# Patient Record
Sex: MALE | Race: WHITE | URBAN - METROPOLITAN AREA
[De-identification: names, ages, dates, MRNs, and addresses within clinical notes are randomized per-mention and may not be internally consistent; named-entity substitution may affect disease eponyms.]

---

## 2019-04-03 ENCOUNTER — HOSPITAL ENCOUNTER (OUTPATIENT)
Dept: PHYSICAL THERAPY | Age: 64
Discharge: HOME | End: 2019-04-03
Payer: COMMERCIAL

## 2019-04-03 PROCEDURE — 97110 THERAPEUTIC EXERCISES: CPT

## 2019-04-03 PROCEDURE — 97140 MANUAL THERAPY 1/> REGIONS: CPT

## 2019-04-03 PROCEDURE — 97161 PT EVAL LOW COMPLEX 20 MIN: CPT

## 2019-04-03 NOTE — THERAPY EVALUATION
Pawan Aguilera  : 1955  Primary: Marce Lauren  Secondary:  [facility] at [address]  [phone]   [phone]        OUTPATIENT PHYSICAL THERAPY:Initial Assessment 4/3/2019   ICD-10: Treatment Diagnosis: Pain in Joint, L knee [M25.562]; Strain of Quadriceps Muscle, Fascia and Tendon, Sequela Left [L73.013K]  Precautions/Allergies:   Patient has no allergy information on record. MD Orders: Evaluate and Treat MEDICAL/REFERRING DIAGNOSIS:  Left knee pain [M25.562]   DATE OF ONSET: 2019  REFERRING PHYSICIAN: Sharon Onofre MD  RETURN PHYSICIAN APPOINTMENT: TBD     INITIAL ASSESSMENT:  Mr. Rebecca Robertson presents with a L quadriceps muscle strain and decreased neuromuscular control of the L quadriceps muscle following hyperextension knee injury 6 weeks ago. Pain in the L knee is reproduced with resisted hip flexion and knee extension. Pain is located along the distal quadriceps muscle into the patellar tendon. Ligamentous testing is negative in the L knee and MRI rules out intraarticular damage (per patient subjective) besides tearing in meniscus. Pt. Additionally presents with flexibility limitations in the triceps surae and quadriceps. Pt. Will benefit from a strengthening and flexibility program to improve work activities and return to previous level of function. PROBLEM LIST (Impacting functional limitations):  1. Decreased Strength  2. Decreased ADL/Functional Activities  3. Increased Pain  4. Decreased Activity Tolerance  5. Decreased Flexibility/Joint Mobility  6. Decreased [place] with Home Exercise Program INTERVENTIONS PLANNED: (Treatment may consist of any combination of the following)  1. Balance Exercise  2. Home Exercise Program (HEP)  3. Manual Therapy  4. Neuromuscular Re-education/Strengthening  5. Range of Motion (ROM)  6. Therapeutic Activites  7.  Therapeutic Exercise/Strengthening TREATMENT PLAN:  Effective Dates: 4/3/2019 TO 6/2/2019 (60 days). Frequency/Duration: 2 times a week for 60 Day(s)  GOALS: (Goals have been discussed and agreed upon with patient.)  Discharge Goals: Time Frame: 6 weeks  1. Pt. Will complete 2 flights of stairs with 0/10 L knee pain to improve pain complaints with stairs at work. 2. Pt. Will demonstrate 5/5 MMT in L knee extension to demonstrate improved strength. 3. Pt. Will score >60 on the LEFS to demonstrate improved pain and function. OUTCOME MEASURE:   Tool Used: Lower Extremity Functional Scale (LEFS)  Score:  Initial: 48/80 Most Recent: X/80 (Date: -- )   Interpretation of Score: 20 questions each scored on a 5 point scale with 0 representing \"extreme difficulty or unable to perform\" and 4 representing \"no difficulty\". The lower the score, the greater the functional disability. 80/80 represents no disability. Minimal detectable change is 9 points. MEDICAL NECESSITY:   · Patient is expected to demonstrate progress in strength and range of motion to increase independence with work activies and ADL's.  REASON FOR SERVICES/OTHER COMMENTS:  · Patient will benefit from skilled PT to address impairments identified through evaluation and return to previous ADL and recreational capacity. Total Duration:  PT Patient Time In/Time Out  Time In: 1030  Time Out: 1130    Rehabilitation Potential For Stated Goals: Good  Regarding Alexis Aguilera's therapy, I certify that the treatment plan above will be carried out by a therapist or under their direction. Thank you for this referral,  Karen James, PT     Referring Physician Signature: Bobby Brown MD _______________________________ Date _____________     PAIN/SUBJECTIVE:   Initial: Pain Intensity 1: 7 /10 Post Session:  6/10   HISTORY:   History of Injury/Illness (Reason for Referral):  Pt.  Attends PT c/o L knee pain after slipping down 3 stairs and landing on the L foot with the knee going into hyperextension in mid February. Pt. Notes initially he experienced moderate knee pain and swelling. The swelling improved but he is still having pain and weakness with stair negotiation and with prolonged sitting. The pain is located just above the patella. Pt. Notes the pain feels deep. MRI revealed mild meniscus tearing and a sprain to the quadriceps. Pt. Would like to return to full work duties without knee pain. Past Medical History/Comorbidities:   Mr. Shania Salvador  has no past medical history on file. Mr. Shania Salvador  has no past surgical history on file. Social History/Living Environment:     Lives with wife in two story home  Prior Level of Function/Work/Activity:  Functioned independently without limitations. Ambulatory/Rehab Services H2 Model Falls Risk Assessment   Risk Factors:       (1)  Gender [Male]       (5)  History of Recent Falls [w/in 3 months] Ability to Rise from Chair:       (0)  Ability to rise in a single movement   Falls Prevention Plan:       No modifications necessary   Total: (5 or greater = High Risk): 6   ©2010 [facility] [employer]. All Rights Reserved. [facility] Patent #[id number]. Federal Law prohibits the replication, distribution or use without written permission from [facility] [employer]   Current Medications:     No current outpatient medications on file.    Date Last Reviewed:  4/3/2019   Number of Personal Factors/Comorbidities that affect the Plan of Care: 0: LOW COMPLEXITY   EXAMINATION:    Strength:       RIGHT LEFT    Knee extension  5/5 4/5 (difficulty with quadriceps setting compared to R)     Knee flexion 5/5 5/5     Hip extension  5/5 5/5    Hip abduction  5/5 5/5     Hip flexion  5/5  4+/5 (pain in L knee)          ROM:       RIGHT LEFT    Knee extension  (-2)  (-3)    Knee flexion  125  125          Flexibility:       RIGHT LEFT    Rectus femoris  limited limited     Psoas  limited  limited    Adductors        Hamstrings  limited limited Gastrocnemius   limited  limited          Joint Mobility:        RIGHT LEFT       --  --          Special Testing            Varus/Valgus testing (-)     Lachmans (-)     PCL testing (-)     Thessaly (-)     Joint line tenderness (-)                 Swelling/Edema:       RIGHT LEFT    Joint line 43 CM 43 CM               Body Structures Involved:  1. Joints  2. Muscles Body Functions Affected:  1. Neuromusculoskeletal  2. Movement Related Activities and Participation Affected:  1. Mobility  2.  Self Care   Number of elements (examined above) that affect the Plan of Care: 1-2: LOW COMPLEXITY   CLINICAL PRESENTATION:   Presentation: Stable and uncomplicated: LOW COMPLEXITY   CLINICAL DECISION MAKING:   Use of outcome tool(s) and clinical judgement create a POC that gives a: Clear prediction of patient's progress: LOW COMPLEXITY

## 2019-04-03 NOTE — PROGRESS NOTES
Eva Aguilera  : 1955  Primary: Regulo Lauren  Secondary:  [facility] at [facility], [address]  [phone]   [phone]      OUTPATIENT PHYSICAL THERAPY: Daily Treatment Note 4/3/2019  Pre-treatment Symptoms/Complaints:  L knee pain  Pain: Initial: Pain Intensity 1: 7 /10 Post Session:  6/10   Medications Last Reviewed:  4/3/2019  Updated Objective Findings:  See evaluation note from today   TREATMENT:     Therapeutic Exercise: (15 Minutes):  Exercises per grid below to improve mobility and strength. Required minimal verbal and manual cues to promote proper body alignment and promote proper body mechanics. Progressed repetitions as indicated. Date:  4/3/2019   Activity/Exercise Parameters   Quadriceps setting 5 minutes   SLR with quad set 3 x 5   Calf stretch 4 minutes   Quadriceps stretch 4 minutes               Manual Therapy (    Soft Tissue Mobilization Duration  Duration: 10 Minutes): Manual techniques to facilitate improved motion and decreased pain. (Used abbreviations: MET - muscle energy technique; PNF - proprioceptive neuromuscular facilitation; NMR - neuromuscular re-education; a/p - anterior to posterior; p/a - posterior to anterior)   · Soft tissue mobilization: L quadriceps  · Joint mobilization: L patellar mobilization: grade III all directions. MedBridge Portal  Treatment/Session Summary:    · Response to Treatment:  Pt. tolerates todays treatment without complaints. · Communication/Consultation:  None today  · Equipment provided today:  None today  · Recommendations/Intent for next treatment session: Next visit will focus on Quadriceps motor control and strengthening.  .  Treatment Plan of Care Effective Dates:  4/3/19 to 19  Total Treatment Billable Duration:  Evaluation 35 minutes, 15 minutes therapeutic exercise, 10 minutes manual   PT Patient Time In/Time Out  Time In: 1030  Time Out: [id number] [facility] PT    Future Appointments   Date Time Provider James Medellin   4/9/2019  9:30 AM Payal Zhu, PT [facility]   4/10/2019  9:30 AM Payal Zhu, PT [facility]   4/17/2019  8:30 AM Payal Zhu, PT [facility]   4/18/2019  8:30 AM Payal Zhu, PT [facility]   4/22/2019  1:30 PM Payal Zhu, PT [facility]

## 2019-04-09 ENCOUNTER — HOSPITAL ENCOUNTER (OUTPATIENT)
Dept: PHYSICAL THERAPY | Age: 64
Discharge: HOME | End: 2019-04-09
Payer: COMMERCIAL

## 2019-04-09 PROCEDURE — 97110 THERAPEUTIC EXERCISES: CPT

## 2019-04-09 PROCEDURE — 97140 MANUAL THERAPY 1/> REGIONS: CPT

## 2019-04-09 NOTE — PROGRESS NOTES
Cameron Aguilera  : 1955  Primary: Devan P[facility]  Secondary:  [address] at [address]  [phone]   [phone]      OUTPATIENT PHYSICAL THERAPY: Daily Treatment Note 2019  Pre-treatment Symptoms/Complaints:  Pt. Notes improved L knee pain following initial evaluation. Pain: Initial: Pain Intensity 1: 10 Post Session:  5/10   Medications Last Reviewed:  2019  Updated Objective Findings:  Improved quadriceps setting today. TREATMENT:     Therapeutic Exercise: (30 Minutes):  Exercises per grid below to improve mobility and strength. Required minimal verbal and manual cues to promote proper body alignment and promote proper body mechanics. Progressed repetitions as indicated. Date:  2019   Activity/Exercise Parameters   Quadriceps setting 3 minutes   SLR with quad set 3 x 10   Calf stretch 4 minutes   Quadriceps stretch 5 minutes   Single leg balance 2 minutes   Sit to stand (elevated) 3 x 10   Sidelying hip abduction 3 x 10   Lateral walking 4 minutes   Manual Therapy (    Soft Tissue Mobilization Duration  Duration: 25 Minutes): Manual techniques to facilitate improved motion and decreased pain. (Used abbreviations: MET - muscle energy technique; PNF - proprioceptive neuromuscular facilitation; NMR - neuromuscular re-education; a/p - anterior to posterior; p/a - posterior to anterior)   · Soft tissue mobilization: B quadriceps, triceps surae  · Joint mobilization: L patellar mobilization: grade III all directions. · Joint mobilization: long axis traction grade III distraction      MedBridge Portal  Treatment/Session Summary:    · Response to Treatment:  Pt. fatigues easily with initiation of lower quarter strengthening exercises. Myofascial trigger points in the L quadriceps improve following manual therapy todya.  .  · Communication/Consultation:  None today  · Equipment provided today:  None today  · Recommendations/Intent for next treatment session: Next visit will focus on Quadriceps motor control and strengthening.  .  Treatment Plan of Care Effective Dates:  4/3/19 to 6/2/19  Total Treatment Billable Duration:  55 minutes   PT Patient Time In/Time Out  Time In: 0900  Time Out: 71 Effie Chance PT    Future Appointments   Date Time Provider James Medellin   4/17/2019  8:30 AM LEEANNA Rubio   4/18/2019  8:30 AM LEEANNA Rubio [facility]   4/22/2019  1:30 PM Rachel Laboy PT [facility]   4/23/2019 10:30 AM Rachel Laboy PT [facility]

## 2019-04-10 ENCOUNTER — APPOINTMENT (OUTPATIENT)
Dept: PHYSICAL THERAPY | Age: 64
End: 2019-04-10
Payer: COMMERCIAL

## 2019-04-17 ENCOUNTER — HOSPITAL ENCOUNTER (OUTPATIENT)
Dept: PHYSICAL THERAPY | Age: 64
Discharge: HOME | End: 2019-04-17
Payer: COMMERCIAL

## 2019-04-17 PROCEDURE — 97140 MANUAL THERAPY 1/> REGIONS: CPT

## 2019-04-17 PROCEDURE — 97110 THERAPEUTIC EXERCISES: CPT

## 2019-04-17 NOTE — PROGRESS NOTES
Dave Aguilera  : 1955  Primary: Leighton Lauren  Secondary:  [address]  at [facility], [address] [facility]  [phone]   [phone]      OUTPATIENT PHYSICAL THERAPY: Daily Treatment Note 2019  Pre-treatment Symptoms/Complaints:  Pt. Reports improved L knee pain with stairs. Pain: Initial: Pain Intensity 1: 10 Post Session:  4/10   Medications Last Reviewed:  2019  Updated Objective Findings:  Minimal myofascial trigger points in the L quadriceps today. TREATMENT:     Therapeutic Exercise: (40 Minutes):  Exercises per grid below to improve mobility and strength. Required minimal verbal and manual cues to promote proper body alignment and promote proper body mechanics. Progressed repetitions as indicated. Date:  2019   Activity/Exercise Parameters   Quadriceps setting 1 minute   SLR with quad set 3 x 10   Calf stretch 4 minutes   Quadriceps stretch 5 minutes   Stars 5 minutes   Sit to stand (elevated) 3 x 10   Sidelying hip abduction 3 x 10   Lateral walking 4 minutes   Stair Negotiation 5 minutes   Manual Therapy (    Soft Tissue Mobilization Duration  Duration: 15 Minutes): Manual techniques to facilitate improved motion and decreased pain. (Used abbreviations: MET - muscle energy technique; PNF - proprioceptive neuromuscular facilitation; NMR - neuromuscular re-education; a/p - anterior to posterior; p/a - posterior to anterior)   · Soft tissue mobilization: B quadriceps, triceps surae  · Joint mobilization: L patellar mobilization: grade III all directions. · Joint mobilization: long axis traction grade III distraction      MedBridge Portal  Treatment/Session Summary:    · Response to Treatment:  Pt. demonstrates mild L distal quadriceps pain with stair descent today using one hand railing. Pt. will benefit from continued lower quarter strengthening.  .  · Communication/Consultation:  None today  · Equipment provided today:  None today  · Recommendations/Intent for next treatment session: Next visit will focus on Quadriceps motor control and strengthening.  .  Treatment Plan of Care Effective Dates:  4/3/19 to 6/2/19  Total Treatment Billable Duration:  55 minutes   PT Patient Time In/Time Out  Time In: 0830  Time Out: 0930  Georgiana Haque PT    Future Appointments   Date Time Provider James Medellin   4/18/2019  8:30 AM LEEANNA Cameron   4/22/2019  1:30 PM LEEANNA Cameron   4/23/2019 10:30 AM Nancy Prater PT [facility]

## 2019-04-18 ENCOUNTER — HOSPITAL ENCOUNTER (OUTPATIENT)
Dept: PHYSICAL THERAPY | Age: 64
Discharge: HOME | End: 2019-04-18
Payer: COMMERCIAL

## 2019-04-18 PROCEDURE — 97110 THERAPEUTIC EXERCISES: CPT

## 2019-04-18 PROCEDURE — 97140 MANUAL THERAPY 1/> REGIONS: CPT

## 2019-04-18 NOTE — PROGRESS NOTES
Olaf Aguilera  : 1955  Primary: Js Steen Xin  Secondary:  [address] at [address]  [phone]   [phone]      OUTPATIENT PHYSICAL THERAPY: Daily Treatment Note 2019  Pre-treatment Symptoms/Complaints:  Pt. Denies knee pain this week, just muscle soreness from previous PT session. Pain: Initial: Pain Intensity 1: 1 /10 Post Session:  1/10   Medications Last Reviewed:  2019  Updated Objective Findings:  None Today   TREATMENT:     Therapeutic Exercise: (45 Minutes):  Exercises per grid below to improve mobility and strength. Required minimal verbal and manual cues to promote proper body alignment and promote proper body mechanics. Progressed repetitions as indicated. Date:  2019   Activity/Exercise Parameters   Quadriceps setting 1 minute   SLR with quad set 3 x 15   Calf stretch 4 minutes   Quadriceps stretch 5 minutes   Stars 5 minutes   Sit to stand (elevated) 3 x 10   Sidelying hip abduction --   Lateral walking 4 minutes   Stair Negotiation --   clams 3 x 15   walking 5 minutes   bridges 3 x 10   Single leg shuttle 3 x 10   Manual Therapy (    Soft Tissue Mobilization Duration  Duration: 10 Minutes): Manual techniques to facilitate improved motion and decreased pain. (Used abbreviations: MET - muscle energy technique; PNF - proprioceptive neuromuscular facilitation; NMR - neuromuscular re-education; a/p - anterior to posterior; p/a - posterior to anterior)   · Soft tissue mobilization: B quadriceps, triceps surae  · Joint mobilization: L patellar mobilization: grade III all directions. MedBridge Portal  Treatment/Session Summary:    · Response to Treatment:  Pt. demonstrates absence of L knee pain with walking and stair negotiation today.   Pt. demonstrates remaining strength deficits in the L quadriceps as demonstrated with extensor lag during quad set and fatigue during single leg shuttle compared to R side. .  · Communication/Consultation:  None today  · Equipment provided today:  None today  · Recommendations/Intent for next treatment session: Next visit will focus on Quadriceps motor control and strengthening.  .  Treatment Plan of Care Effective Dates:  4/3/19 to 6/2/19  Total Treatment Billable Duration:  55 minutes   PT Patient Time In/Time Out  Time In: 0830  Time Out: 0930  Susanne Mckeon PT    Future Appointments   Date Time Provider James Medellin   4/22/2019  1:30 PM LEEANNA Finley   4/23/2019 10:[facility]

## 2019-04-24 ENCOUNTER — APPOINTMENT (OUTPATIENT)
Dept: PHYSICAL THERAPY | Age: 64
End: 2019-04-24
Payer: COMMERCIAL